# Patient Record
Sex: MALE | Race: OTHER | HISPANIC OR LATINO
[De-identification: names, ages, dates, MRNs, and addresses within clinical notes are randomized per-mention and may not be internally consistent; named-entity substitution may affect disease eponyms.]

---

## 2018-09-28 ENCOUNTER — APPOINTMENT (OUTPATIENT)
Dept: INTERNAL MEDICINE | Facility: CLINIC | Age: 48
End: 2018-09-28
Payer: MEDICAID

## 2018-09-28 VITALS
WEIGHT: 177 LBS | HEIGHT: 68 IN | HEART RATE: 65 BPM | DIASTOLIC BLOOD PRESSURE: 82 MMHG | OXYGEN SATURATION: 98 % | BODY MASS INDEX: 26.83 KG/M2 | TEMPERATURE: 98 F | SYSTOLIC BLOOD PRESSURE: 126 MMHG

## 2018-09-28 VITALS — DIASTOLIC BLOOD PRESSURE: 88 MMHG | SYSTOLIC BLOOD PRESSURE: 130 MMHG

## 2018-09-28 DIAGNOSIS — Z82.49 FAMILY HISTORY OF ISCHEMIC HEART DISEASE AND OTHER DISEASES OF THE CIRCULATORY SYSTEM: ICD-10-CM

## 2018-09-28 DIAGNOSIS — Z83.3 FAMILY HISTORY OF DIABETES MELLITUS: ICD-10-CM

## 2018-09-28 PROCEDURE — 36415 COLL VENOUS BLD VENIPUNCTURE: CPT

## 2018-09-28 PROCEDURE — G0008: CPT

## 2018-09-28 PROCEDURE — 99386 PREV VISIT NEW AGE 40-64: CPT | Mod: 25

## 2018-09-28 PROCEDURE — 90686 IIV4 VACC NO PRSV 0.5 ML IM: CPT

## 2018-09-28 RX ORDER — SILDENAFIL 100 MG/1
100 TABLET, FILM COATED ORAL
Qty: 10 | Refills: 6 | Status: ACTIVE | COMMUNITY
Start: 2018-09-28 | End: 1900-01-01

## 2018-10-19 LAB
ALBUMIN SERPL ELPH-MCNC: 4.4 G/DL
ALP BLD-CCNC: 56 U/L
ALT SERPL-CCNC: 30 U/L
ANION GAP SERPL CALC-SCNC: 14 MMOL/L
AST SERPL-CCNC: 32 U/L
BASOPHILS # BLD AUTO: 0.06 K/UL
BASOPHILS NFR BLD AUTO: 1 %
BILIRUB SERPL-MCNC: 0.7 MG/DL
BUN SERPL-MCNC: 15 MG/DL
C TRACH RRNA SPEC QL NAA+PROBE: NOT DETECTED
CALCIUM SERPL-MCNC: 9.5 MG/DL
CHLORIDE SERPL-SCNC: 103 MMOL/L
CHOLEST SERPL-MCNC: 230 MG/DL
CHOLEST/HDLC SERPL: 5.3 RATIO
CO2 SERPL-SCNC: 22 MMOL/L
CREAT SERPL-MCNC: 1.13 MG/DL
EOSINOPHIL # BLD AUTO: 0.22 K/UL
EOSINOPHIL NFR BLD AUTO: 3.6 %
GLUCOSE SERPL-MCNC: 82 MG/DL
HBA1C MFR BLD HPLC: 5.5 %
HCT VFR BLD CALC: 46.6 %
HDLC SERPL-MCNC: 43 MG/DL
HGB BLD-MCNC: 15.2 G/DL
HIV1+2 AB SPEC QL IA.RAPID: NONREACTIVE
IMM GRANULOCYTES NFR BLD AUTO: 0.2 %
LDLC SERPL CALC-MCNC: 135 MG/DL
LYMPHOCYTES # BLD AUTO: 2.69 K/UL
LYMPHOCYTES NFR BLD AUTO: 43.7 %
MAN DIFF?: NORMAL
MCHC RBC-ENTMCNC: 27.1 PG
MCHC RBC-ENTMCNC: 32.6 GM/DL
MCV RBC AUTO: 83.2 FL
MONOCYTES # BLD AUTO: 0.34 K/UL
MONOCYTES NFR BLD AUTO: 5.5 %
N GONORRHOEA RRNA SPEC QL NAA+PROBE: NOT DETECTED
NEUTROPHILS # BLD AUTO: 2.83 K/UL
NEUTROPHILS NFR BLD AUTO: 46 %
PLATELET # BLD AUTO: 253 K/UL
POTASSIUM SERPL-SCNC: 3.9 MMOL/L
PROT SERPL-MCNC: 7.8 G/DL
PSA SERPL-MCNC: 0.73 NG/ML
RBC # BLD: 5.6 M/UL
RBC # FLD: 13.9 %
SODIUM SERPL-SCNC: 139 MMOL/L
SOURCE AMPLIFICATION: NORMAL
TRIGL SERPL-MCNC: 261 MG/DL
WBC # FLD AUTO: 6.15 K/UL

## 2019-03-05 NOTE — PHYSICAL EXAM
[No Acute Distress] : no acute distress [Well Nourished] : well nourished [Well Developed] : well developed [Well-Appearing] : well-appearing [Normal Sclera/Conjunctiva] : normal sclera/conjunctiva [PERRL] : pupils equal round and reactive to light [Normal Outer Ear/Nose] : the outer ears and nose were normal in appearance [Normal Oropharynx] : the oropharynx was normal [Normal TMs] : both tympanic membranes were normal [No JVD] : no jugular venous distention [Supple] : supple [No Lymphadenopathy] : no lymphadenopathy [Thyroid Normal, No Nodules] : the thyroid was normal and there were no nodules present [No Respiratory Distress] : no respiratory distress  [Clear to Auscultation] : lungs were clear to auscultation bilaterally [No Accessory Muscle Use] : no accessory muscle use [Normal Percussion] : the chest was normal to percussion [Normal Rate] : normal rate  [Regular Rhythm] : with a regular rhythm [Normal S1, S2] : normal S1 and S2 [No Murmur] : no murmur heard [No Carotid Bruits] : no carotid bruits [No Abdominal Bruit] : a ~M bruit was not heard ~T in the abdomen [No Varicosities] : no varicosities [Pedal Pulses Present] : the pedal pulses are present [No Edema] : there was no peripheral edema [No Extremity Clubbing/Cyanosis] : no extremity clubbing/cyanosis [No Palpable Aorta] : no palpable aorta [Declined Breast Exam] : declined breast exam  [Normal Appearance] : normal in appearance [No Nipple Discharge] : no nipple discharge [Soft] : abdomen soft [Non Tender] : non-tender [Non-distended] : non-distended [No Masses] : no abdominal mass palpated [No HSM] : no HSM [Normal Bowel Sounds] : normal bowel sounds [No Hernias] : no hernias [No Stool to Guaiac] : no stool to guaiac [Normal Sphincter Tone] : normal sphincter tone [Normal Supraclavicular Nodes] : no supraclavicular lymphadenopathy [Normal Axillary Nodes] : no axillary lymphadenopathy [Normal Posterior Cervical Nodes] : no posterior cervical lymphadenopathy [Normal Femoral Nodes] : no femoral lymphadenopathy [Normal Anterior Cervical Nodes] : no anterior cervical lymphadenopathy [Normal Inguinal Nodes] : no inguinal lymphadenopathy [No Joint Swelling] : no joint swelling [Grossly Normal Strength/Tone] : grossly normal strength/tone [No Rash] : no rash [No Skin Lesions] : no skin lesions [Normal Gait] : normal gait [Coordination Grossly Intact] : coordination grossly intact [No Focal Deficits] : no focal deficits [Deep Tendon Reflexes (DTR)] : deep tendon reflexes were 2+ and symmetric [Speech Grossly Normal] : speech grossly normal [Memory Grossly Normal] : memory grossly normal [Normal Affect] : the affect was normal [Alert and Oriented x3] : oriented to person, place, and time [Normal Mood] : the mood was normal [Normal Insight/Judgement] : insight and judgment were intact [Stool Occult Blood] : stool negative for occult blood [de-identified] : -Pes planus Tenderness left instep [de-identified] : Right side skin tag

## 2019-03-05 NOTE — HISTORY OF PRESENT ILLNESS
[FreeTextEntry1] : Comprehensive annual physical examination left foot pain [de-identified] : \par \par The patient is a 47-year-old male with no significant past medical history who presents for comprehensive annual physical examination. Patient complains of several week history of left foot pain after some chronic has pain and swelling after playing tennis. Denies redness, fever, chills, also denies chest pain, shortness of breath, dyspnea exertion notes occasionally blood-tinged stool. Denies trauma pain, nausea, vomiting, constipation, diarrhea, weight loss. Otherwise, feels well

## 2019-03-05 NOTE — REVIEW OF SYSTEMS
[Melena] : melerick [Frequency] : frequency [Joint Pain] : joint pain [Negative] : Heme/Lymph [Abdominal Pain] : no abdominal pain [Nausea] : no nausea [Constipation] : no constipation [Diarrhea] : no diarrhea [Vomiting] : no vomiting [Heartburn] : no heartburn [Dysuria] : no dysuria [Incontinence] : no incontinence [Hesitancy] : no hesitancy [Nocturia] : no nocturia [Hematuria] : no hematuria [Impotence] : no impotency [Poor Libido] : libido not poor [Joint Stiffness] : no joint stiffness [Muscle Pain] : no muscle pain [Muscle Weakness] : no muscle weakness [Back Pain] : no back pain [Joint Swelling] : no joint swelling

## 2019-03-05 NOTE — HEALTH RISK ASSESSMENT
[Excellent] : ~his/her~  mood as  excellent [No falls in past year] : Patient reported no falls in the past year [0] : 2) Feeling down, depressed, or hopeless: Not at all (0) [Patient reported colonoscopy was normal] : Patient reported colonoscopy was normal [] : No [de-identified] : glass wine one a night [ColonoscopyDate] : 2010

## 2019-03-05 NOTE — ASSESSMENT
[FreeTextEntry1] : The patient is a healthy 47-year-old male, who presents with left foot pain, and for comprehensive annual physical\par \par \par #1 Left foot injury soft tissue.\par Check x-ray.\par Ice, elevation, nonsteroidals.\par If fails to improved refer to orthopedist\par \par #2 increased frequency urination.\par Was likely   secondary to BPH.\par Check PSA, UA.\par \par #3 health maintenance.\par Check CBC, metabolic panel, lipid panel, UA, PSA, HIV, GC, chlamydia, and hemoglobin A1c\par f/u up and to follow

## 2019-03-08 ENCOUNTER — APPOINTMENT (OUTPATIENT)
Dept: INTERNAL MEDICINE | Facility: CLINIC | Age: 49
End: 2019-03-08
Payer: MEDICAID

## 2019-03-08 VITALS
OXYGEN SATURATION: 98 % | TEMPERATURE: 98.2 F | DIASTOLIC BLOOD PRESSURE: 86 MMHG | HEIGHT: 68 IN | BODY MASS INDEX: 26.67 KG/M2 | WEIGHT: 176 LBS | SYSTOLIC BLOOD PRESSURE: 138 MMHG | HEART RATE: 68 BPM

## 2019-03-08 PROCEDURE — 99214 OFFICE O/P EST MOD 30 MIN: CPT

## 2019-03-08 NOTE — ASSESSMENT
[FreeTextEntry1] : Patient is a healthy 48-year-old male who presents with several month history of bilateral shoulder pain and episode of traveler's diarrhea\par \par 1.  Traveler's diarrhea\par Res[facility]\par Observe\par \par 2.  Bilateral shoulder pain\par Is likely tendinitis right greater than left\par Nonresponsive to rest ice and nonsteroidals\par Refer to orthopedics\par \par 3.  Health maintenance\par Patient reports colonoscopy in September normal

## 2019-03-08 NOTE — HISTORY OF PRESENT ILLNESS
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  He denies weakness, fever, rash.  Also recent episode of diarrhea after returning from [place] denies melena abdominal pain nausea vomiting fever chills

## 2019-03-08 NOTE — REVIEW OF SYSTEMS
[Joint Pain] : joint pain [Negative] : Gastrointestinal [Joint Stiffness] : no joint stiffness [Muscle Pain] : no muscle pain [Muscle Weakness] : no muscle weakness [Back Pain] : no back pain [Joint Swelling] : no joint swelling

## 2019-03-08 NOTE — PHYSICAL EXAM
[No Acute Distress] : no acute distress [Well Nourished] : well nourished [Well Developed] : well developed [Normal Voice/Communication] : normal voice/communication [No Respiratory Distress] : no respiratory distress  [Clear to Auscultation] : lungs were clear to auscultation bilaterally [No Accessory Muscle Use] : no accessory muscle use [Normal Rate] : normal rate  [Regular Rhythm] : with a regular rhythm [Normal S1, S2] : normal S1 and S2 [Soft] : abdomen soft [Non Tender] : non-tender [No HSM] : no HSM [Normal Bowel Sounds] : normal bowel sounds [de-identified] : Bilateral full range of motion minimal pain no point tenderness no swelling no erythema negative empty can sign

## 2019-04-02 ENCOUNTER — FORM ENCOUNTER (OUTPATIENT)
Age: 49
End: 2019-04-02

## 2019-04-03 ENCOUNTER — OUTPATIENT (OUTPATIENT)
Dept: OUTPATIENT SERVICES | Facility: HOSPITAL | Age: 49
LOS: 1 days | End: 2019-04-03
Payer: MEDICAID

## 2019-04-03 ENCOUNTER — APPOINTMENT (OUTPATIENT)
Dept: PHYSICAL MEDICINE AND REHAB | Facility: CLINIC | Age: 49
End: 2019-04-03
Payer: MEDICAID

## 2019-04-03 VITALS — HEART RATE: 72 BPM | WEIGHT: 179 LBS | OXYGEN SATURATION: 98 % | HEIGHT: 68 IN | BODY MASS INDEX: 27.13 KG/M2

## 2019-04-03 DIAGNOSIS — M25.511 PAIN IN RIGHT SHOULDER: ICD-10-CM

## 2019-04-03 DIAGNOSIS — M25.512 PAIN IN RIGHT SHOULDER: ICD-10-CM

## 2019-04-03 PROCEDURE — 99203 OFFICE O/P NEW LOW 30 MIN: CPT

## 2019-04-03 PROCEDURE — 73030 X-RAY EXAM OF SHOULDER: CPT | Mod: 26,50

## 2019-04-03 PROCEDURE — 73030 X-RAY EXAM OF SHOULDER: CPT

## 2019-04-03 NOTE — PHYSICAL EXAM
[FreeTextEntry1] : GEN: AAOx3, NAD.\par PSYCH: Normal mood and affect. Responds appropriately to commands.\par EYES: Sclerae Anicteric. No discharge. EOMI.\par RESP: Breathing unlabored.\par CV: Radial pulses 2+ and equal. No varicosities noted.\par EXT: No C/C/E.\par SKIN: No lesions noted.\par LYMPH: No supraclavicular, anterior or posterior cervical lymphadenopathy appreciated.\par GAIT: Non antalgic, normal reciprocating heel to toe.\par INSP: No Atrophy, Bony Deformity, Swelling. Shoulder height symmetric. Normal Thoracic Kyphosis.\par Cervical ROM: Full and pain free.\par Shoulder AROM: Full with pain Flex/ABd>90 Right. Painful Arc (+) Right. No crepitus. No scapulothoracic dyskinesia or winging. PROM IR/ER full with pain throughout range IR/ER.\par PALP: No tenderness anterior, lateral, or posterior compartments.\par STRENGTH: 5/5 bilateral shoulder abductors, internal rotation, external rotation, elbow flexors, elbow extensors, wrist extensors, long finger flexors, hand intrinsics, .\par TONE: Normal, No clonus.\par SENSATION:  Normal to light touch in the bilateral upper extremities.\par REFLEXES: 2+ symmetric biceps, triceps, brachioradialis, pronator teres. Marte negative bilaterally.\par SPECIAL: Neers, Walter, Stephanie, Empty Can, Speed’s (+) Right. Drop arm, Spurling's negative bilaterally.

## 2019-04-03 NOTE — ASSESSMENT
[FreeTextEntry1] : Impression:\par 1. Right RTC Tendinopathy/SAB\par \par Plan: After review of the history, physical examination, and imaging, the patient's symptoms are consistent with Right RTC Tendinopathy/SAB. The imaging results and diagnosis were discussed in detail with the patient. We discussed all the potential treatment options including physical therapy, oral medication, ultrasound guided injections, and surgery; as well as alternative therapeutics such as acupuncture and massage. We also discussed the importance of weight loss, ergonomics, and posture in the long term management of the condition. At this time I am recommending that the patient begin a formal PT program. The patient would also like to schedule USG SAB CSI due to the degree of pain he is in. The patient will return to the office for injection and then begin PT and followup in 2-3 months. The patient expressed verbal understanding and is in agreement with the plan of care. All of the patient's questions and concerns were addressed during today's visit.\par

## 2019-04-03 NOTE — HISTORY OF PRESENT ILLNESS
[FreeTextEntry1] : Mr. JOSEPH AVILES is a very pleasant 48 year male who comes in for evaluation of the right shoulder that has been ongoing for 4 months without any specific injury or inciting event. The pain is located primarily the right shoulder intermittent in nature and described as sharp pain. The pain is rated as 4/10 during today's visit, and ranges from 6-8/10. The patient's symptoms are aggravated by exercising and alleviated by doing nothing. The patient denies any night pain, numbness/tingling, weakness, or bowel/bladder dysfunction. The patient has no other complaints at this time.

## 2019-04-04 ENCOUNTER — APPOINTMENT (OUTPATIENT)
Dept: PHYSICAL MEDICINE AND REHAB | Facility: CLINIC | Age: 49
End: 2019-04-04
Payer: MEDICAID

## 2019-04-04 VITALS — RESPIRATION RATE: 16 BRPM | BODY MASS INDEX: 27.13 KG/M2 | HEART RATE: 68 BPM | HEIGHT: 68 IN | WEIGHT: 179 LBS

## 2019-04-04 DIAGNOSIS — M75.51 BURSITIS OF RIGHT SHOULDER: ICD-10-CM

## 2019-04-04 PROCEDURE — 20611 DRAIN/INJ JOINT/BURSA W/US: CPT | Mod: RT

## 2019-04-18 ENCOUNTER — FORM ENCOUNTER (OUTPATIENT)
Age: 49
End: 2019-04-18

## 2019-04-19 ENCOUNTER — APPOINTMENT (OUTPATIENT)
Dept: ULTRASOUND IMAGING | Facility: HOSPITAL | Age: 49
End: 2019-04-19

## 2019-04-19 ENCOUNTER — OUTPATIENT (OUTPATIENT)
Dept: OUTPATIENT SERVICES | Facility: HOSPITAL | Age: 49
LOS: 1 days | End: 2019-04-19
Payer: MEDICAID

## 2019-04-19 ENCOUNTER — APPOINTMENT (OUTPATIENT)
Dept: INTERNAL MEDICINE | Facility: CLINIC | Age: 49
End: 2019-04-19
Payer: MEDICAID

## 2019-04-19 VITALS
HEART RATE: 75 BPM | WEIGHT: 175 LBS | TEMPERATURE: 97.5 F | SYSTOLIC BLOOD PRESSURE: 126 MMHG | DIASTOLIC BLOOD PRESSURE: 80 MMHG | OXYGEN SATURATION: 98 % | HEIGHT: 68 IN | BODY MASS INDEX: 26.52 KG/M2

## 2019-04-19 PROCEDURE — 76700 US EXAM ABDOM COMPLETE: CPT

## 2019-04-19 PROCEDURE — 76700 US EXAM ABDOM COMPLETE: CPT | Mod: 26

## 2019-04-19 PROCEDURE — 76856 US EXAM PELVIC COMPLETE: CPT

## 2019-04-19 PROCEDURE — 76857 US EXAM PELVIC LIMITED: CPT | Mod: 26

## 2019-04-19 PROCEDURE — 76857 US EXAM PELVIC LIMITED: CPT

## 2019-04-19 PROCEDURE — 99214 OFFICE O/P EST MOD 30 MIN: CPT | Mod: 25

## 2019-04-19 PROCEDURE — 81003 URINALYSIS AUTO W/O SCOPE: CPT | Mod: QW

## 2019-04-19 NOTE — PHYSICAL EXAM
[No Acute Distress] : no acute distress [Well Nourished] : well nourished [Well Developed] : well developed [Well-Appearing] : well-appearing [No JVD] : no jugular venous distention [Supple] : supple [No Lymphadenopathy] : no lymphadenopathy [No Respiratory Distress] : no respiratory distress  [Clear to Auscultation] : lungs were clear to auscultation bilaterally [No Accessory Muscle Use] : no accessory muscle use [Normal Rate] : normal rate  [Regular Rhythm] : with a regular rhythm [Normal S1, S2] : normal S1 and S2 [Soft] : abdomen soft [Non Tender] : non-tender [Non-distended] : non-distended [No HSM] : no HSM [Normal Bowel Sounds] : normal bowel sounds [de-identified] : Right flank tenderness

## 2019-04-19 NOTE — HISTORY OF PRESENT ILLNESS
[FreeTextEntry8] : \par \par CC: Flank pain for 5 days\par \par Patient is a healthy 48-year-old man with history of obstruction status post resection, gallstone status post cholecystectomy and nephrolithiasis who presents for 5-day history of right flank pain severe on Monday almost passed out went almost went to the ear pain is been urinating a lot denies fever, chills but notes persistent flank pain

## 2019-04-19 NOTE — ASSESSMENT
[FreeTextEntry1] : History of nephrolithiasis who presents with acute right flank pain since Monday\par \par 1.  Probable nephrolithiasis\par Hydrate with water 3 L a day\par Advil for pain\par Urine dip showed trace blood\par Referral first sonogram of abdomen and pelvis rule out stone if fails to improve consider urology for referral call back if develops fever pain worsens

## 2019-04-19 NOTE — REVIEW OF SYSTEMS
[Frequency] : frequency [Negative] : Gastrointestinal [Dysuria] : no dysuria [Incontinence] : no incontinence [Hesitancy] : no hesitancy [Nocturia] : no nocturia [Hematuria] : no hematuria [Poor Libido] : libido not poor

## 2019-04-24 DIAGNOSIS — N40.0 BENIGN PROSTATIC HYPERPLASIA WITHOUT LOWER URINARY TRACT SYMPTOMS: ICD-10-CM

## 2019-04-24 DIAGNOSIS — Z90.49 ACQUIRED ABSENCE OF OTHER SPECIFIED PARTS OF DIGESTIVE TRACT: ICD-10-CM

## 2019-04-24 DIAGNOSIS — R10.9 UNSPECIFIED ABDOMINAL PAIN: ICD-10-CM

## 2019-04-25 ENCOUNTER — APPOINTMENT (OUTPATIENT)
Dept: PHYSICAL MEDICINE AND REHAB | Facility: CLINIC | Age: 49
End: 2019-04-25
Payer: MEDICAID

## 2019-04-25 VITALS
BODY MASS INDEX: 26.52 KG/M2 | WEIGHT: 175 LBS | HEIGHT: 68 IN | OXYGEN SATURATION: 98 % | RESPIRATION RATE: 16 BRPM | HEART RATE: 78 BPM

## 2019-04-25 DIAGNOSIS — M75.111 INCOMPLETE ROTATOR CUFF TEAR OR RUPTURE OF RIGHT SHOULDER, NOT SPECIFIED AS TRAUMATIC: ICD-10-CM

## 2019-04-25 PROCEDURE — 20611 DRAIN/INJ JOINT/BURSA W/US: CPT | Mod: LT

## 2019-04-25 PROCEDURE — 99214 OFFICE O/P EST MOD 30 MIN: CPT | Mod: 25

## 2019-07-19 ENCOUNTER — APPOINTMENT (OUTPATIENT)
Dept: INTERNAL MEDICINE | Facility: CLINIC | Age: 49
End: 2019-07-19

## 2019-12-12 ENCOUNTER — FORM ENCOUNTER (OUTPATIENT)
Age: 49
End: 2019-12-12

## 2019-12-13 ENCOUNTER — OUTPATIENT (OUTPATIENT)
Dept: OUTPATIENT SERVICES | Facility: HOSPITAL | Age: 49
LOS: 1 days | End: 2019-12-13
Payer: MEDICAID

## 2019-12-13 ENCOUNTER — APPOINTMENT (OUTPATIENT)
Dept: INTERNAL MEDICINE | Facility: CLINIC | Age: 49
End: 2019-12-13
Payer: MEDICAID

## 2019-12-13 VITALS
OXYGEN SATURATION: 98 % | BODY MASS INDEX: 26.52 KG/M2 | SYSTOLIC BLOOD PRESSURE: 142 MMHG | HEART RATE: 83 BPM | TEMPERATURE: 97.3 F | HEIGHT: 68 IN | WEIGHT: 175 LBS | DIASTOLIC BLOOD PRESSURE: 95 MMHG

## 2019-12-13 DIAGNOSIS — M79.672 PAIN IN LEFT FOOT: ICD-10-CM

## 2019-12-13 DIAGNOSIS — M75.112 INCOMPLETE ROTATOR CUFF TEAR OR RUPTURE OF LEFT SHOULDER, NOT SPECIFIED AS TRAUMATIC: ICD-10-CM

## 2019-12-13 PROCEDURE — 73590 X-RAY EXAM OF LOWER LEG: CPT | Mod: 26,LT

## 2019-12-13 PROCEDURE — G0008: CPT

## 2019-12-13 PROCEDURE — 73590 X-RAY EXAM OF LOWER LEG: CPT

## 2019-12-13 PROCEDURE — 36415 COLL VENOUS BLD VENIPUNCTURE: CPT

## 2019-12-13 PROCEDURE — 90686 IIV4 VACC NO PRSV 0.5 ML IM: CPT

## 2019-12-13 PROCEDURE — 93000 ELECTROCARDIOGRAM COMPLETE: CPT

## 2019-12-13 PROCEDURE — 99396 PREV VISIT EST AGE 40-64: CPT | Mod: 25

## 2019-12-13 PROCEDURE — G0442 ANNUAL ALCOHOL SCREEN 15 MIN: CPT

## 2019-12-13 PROCEDURE — 99213 OFFICE O/P EST LOW 20 MIN: CPT | Mod: 25

## 2019-12-13 NOTE — REVIEW OF SYSTEMS
[Negative] : Heme/Lymph [Muscle Pain] : muscle pain [Joint Pain] : no joint pain [Joint Stiffness] : no joint stiffness [Back Pain] : no back pain [Joint Swelling] : no joint swelling [Muscle Weakness] : no muscle weakness

## 2019-12-13 NOTE — HEALTH RISK ASSESSMENT
[Very Good] : ~his/her~ current health as very good [Yes] : Yes [1 or 2 (0 pts)] : 1 or 2 (0 points) [Less than monthly (1 pt)] : Less than monthly (1 point) [No falls in past year] : Patient reported no falls in the past year [0] : 2) Feeling down, depressed, or hopeless: Not at all (0) [Monthly or less (1 pt)] : Monthly or less (1 point) [] : No [Audit-CScore] : 2 [de-identified] : Healthy [de-identified] : Active [[id number]] : 0

## 2019-12-13 NOTE — ASSESSMENT
[FreeTextEntry1] : Patient is a 48-year-old male with history of kidney stones, mild erectile dysfunction, bilateral shoulder pain who presents for comprehensive annual physical examination and noted to have elevated blood pressure and left shin Alex\par \par 1 isolated elevated blood pressure\par May be secondary post intoxication state\par observe follow-up in 2 to 4 weeks\par low sodium diet\par \par 2 left shin bone bruise\par ice/Advil\par check x-ray rule out fracture\par \par 3 history of kidney stones\par asymptomatic at present time hydration\par \par 4 bilateral shoulder pain\par continue Advil if persist consider MRI of neck\par \par 5 hangover\par  on alcohol use\par \par 6 health maintenance\par flu shot today\par colonoscopy done 2018\par check routine labs\par follow-up in 2 to 4 weeks for blood pressure

## 2019-12-13 NOTE — PHYSICAL EXAM
[No Acute Distress] : no acute distress [Well Developed] : well developed [Well Nourished] : well nourished [PERRL] : pupils equal round and reactive to light [Well-Appearing] : well-appearing [Normal Sclera/Conjunctiva] : normal sclera/conjunctiva [Normal Outer Ear/Nose] : the outer ears and nose were normal in appearance [EOMI] : extraocular movements intact [Normal Oropharynx] : the oropharynx was normal [No Lymphadenopathy] : no lymphadenopathy [No JVD] : no jugular venous distention [Supple] : supple [Thyroid Normal, No Nodules] : the thyroid was normal and there were no nodules present [No Respiratory Distress] : no respiratory distress  [Clear to Auscultation] : lungs were clear to auscultation bilaterally [No Accessory Muscle Use] : no accessory muscle use [Regular Rhythm] : with a regular rhythm [Normal S1, S2] : normal S1 and S2 [Normal Rate] : normal rate  [No Carotid Bruits] : no carotid bruits [No Murmur] : no murmur heard [No Abdominal Bruit] : a ~M bruit was not heard ~T in the abdomen [No Varicosities] : no varicosities [Pedal Pulses Present] : the pedal pulses are present [No Edema] : there was no peripheral edema [No Palpable Aorta] : no palpable aorta [No Extremity Clubbing/Cyanosis] : no extremity clubbing/cyanosis [Soft] : abdomen soft [Non Tender] : non-tender [Non-distended] : non-distended [No HSM] : no HSM [No Masses] : no abdominal mass palpated [Normal Bowel Sounds] : normal bowel sounds [Prostate Enlargement] : the prostate was not enlarged [Testes Tenderness] : no tenderness of the testes [Scrotum] : the scrotum was normal [Penis Abnormality] : normal circumcised penis [Prostate Tenderness] : the prostate was not tender [No Prostate Nodules] : no prostate nodules [Normal Axillary Nodes] : no axillary lymphadenopathy [Normal Supraclavicular Nodes] : no supraclavicular lymphadenopathy [Normal Posterior Cervical Nodes] : no posterior cervical lymphadenopathy [No CVA Tenderness] : no CVA  tenderness [Normal Anterior Cervical Nodes] : no anterior cervical lymphadenopathy [Normal Inguinal Nodes] : no inguinal lymphadenopathy [No Joint Swelling] : no joint swelling [Grossly Normal Strength/Tone] : grossly normal strength/tone [No Spinal Tenderness] : no spinal tenderness [No Skin Lesions] : no skin lesions [No Rash] : no rash [Normal Gait] : normal gait [Coordination Grossly Intact] : coordination grossly intact [No Focal Deficits] : no focal deficits [Deep Tendon Reflexes (DTR)] : deep tendon reflexes were 2+ and symmetric [Normal Affect] : the affect was normal [Normal Insight/Judgement] : insight and judgment were intact [de-identified] : Aguila left shift of the tibia

## 2019-12-13 NOTE — HISTORY OF PRESENT ILLNESS
[FreeTextEntry1] : Comprehensive annual physical examination left shin injury [de-identified] : Patient is a 48-year-old male with history of kidney stones, rectal dysfunction, rotator cuff tear partial who presents for comprehensive annual physical examination complaining of left shin pain after breaking ice with the pickax hitting his association leg was initially swollen black and blue occurred two weeks ago leg still very tender and painful he denies chest pain, shortness of breath distant exertion palpitation lightheadedness nausea vomiting constipation or diarrhea otherwise feels well patient had [employer] party heavily drinking last night somewhat of a hangover

## 2019-12-20 LAB
ALBUMIN SERPL ELPH-MCNC: 5.1 G/DL
ALP BLD-CCNC: 69 U/L
ALT SERPL-CCNC: 153 U/L
ANION GAP SERPL CALC-SCNC: 16 MMOL/L
AST SERPL-CCNC: 62 U/L
BASOPHILS # BLD AUTO: 0.08 K/UL
BASOPHILS NFR BLD AUTO: 1.2 %
BILIRUB SERPL-MCNC: 0.3 MG/DL
BUN SERPL-MCNC: 16 MG/DL
C TRACH RRNA SPEC QL NAA+PROBE: NOT DETECTED
CALCIUM SERPL-MCNC: 9.8 MG/DL
CHLORIDE SERPL-SCNC: 104 MMOL/L
CHOLEST SERPL-MCNC: 242 MG/DL
CHOLEST/HDLC SERPL: 6.5 RATIO
CO2 SERPL-SCNC: 17 MMOL/L
CREAT SERPL-MCNC: 0.98 MG/DL
EOSINOPHIL # BLD AUTO: 0.09 K/UL
EOSINOPHIL NFR BLD AUTO: 1.3 %
GLUCOSE SERPL-MCNC: 81 MG/DL
HCT VFR BLD CALC: 54.2 %
HDLC SERPL-MCNC: 37 MG/DL
HGB BLD-MCNC: 16.8 G/DL
HIV1+2 AB SPEC QL IA.RAPID: NONREACTIVE
IMM GRANULOCYTES NFR BLD AUTO: 0.1 %
LDLC SERPL CALC-MCNC: 126 MG/DL
LYMPHOCYTES # BLD AUTO: 1.75 K/UL
LYMPHOCYTES NFR BLD AUTO: 25.5 %
MAN DIFF?: NORMAL
MCHC RBC-ENTMCNC: 26.2 PG
MCHC RBC-ENTMCNC: 31 GM/DL
MCV RBC AUTO: 84.4 FL
MONOCYTES # BLD AUTO: 0.37 K/UL
MONOCYTES NFR BLD AUTO: 5.4 %
N GONORRHOEA RRNA SPEC QL NAA+PROBE: NOT DETECTED
NEUTROPHILS # BLD AUTO: 4.55 K/UL
NEUTROPHILS NFR BLD AUTO: 66.5 %
PLATELET # BLD AUTO: 317 K/UL
POTASSIUM SERPL-SCNC: 4.7 MMOL/L
PROT SERPL-MCNC: 7.8 G/DL
PSA SERPL-MCNC: 0.41 NG/ML
RBC # BLD: 6.42 M/UL
RBC # FLD: 15 %
SODIUM SERPL-SCNC: 137 MMOL/L
SOURCE AMPLIFICATION: NORMAL
TRIGL SERPL-MCNC: 395 MG/DL
WBC # FLD AUTO: 6.85 K/UL

## 2020-01-10 ENCOUNTER — APPOINTMENT (OUTPATIENT)
Dept: INTERNAL MEDICINE | Facility: CLINIC | Age: 50
End: 2020-01-10
Payer: MEDICAID

## 2020-01-10 VITALS
HEART RATE: 77 BPM | BODY MASS INDEX: 27.28 KG/M2 | OXYGEN SATURATION: 97 % | TEMPERATURE: 98.3 F | DIASTOLIC BLOOD PRESSURE: 78 MMHG | WEIGHT: 180 LBS | SYSTOLIC BLOOD PRESSURE: 126 MMHG | HEIGHT: 68 IN

## 2020-01-10 VITALS — SYSTOLIC BLOOD PRESSURE: 122 MMHG | DIASTOLIC BLOOD PRESSURE: 90 MMHG

## 2020-01-10 VITALS — DIASTOLIC BLOOD PRESSURE: 92 MMHG | SYSTOLIC BLOOD PRESSURE: 120 MMHG

## 2020-01-10 PROCEDURE — 99214 OFFICE O/P EST MOD 30 MIN: CPT | Mod: 25

## 2020-01-10 PROCEDURE — 36415 COLL VENOUS BLD VENIPUNCTURE: CPT

## 2020-01-10 NOTE — ASSESSMENT
[FreeTextEntry1] : Patient is a 49-year-old male with history of kidney stones who presents for follow-up of alcohol intoxication leg pain isolated elevated blood pressure transaminases\par \par 1 isolated elevated blood pressure\par remain slightly elevated diastolic\par observe blood pressure check at home\par follow-up in two months\par \par 2 transaminases\par most likely related to alcohol\par check can profile\par \par 3 transaminases\par most likely related to alcohol

## 2020-01-10 NOTE — HISTORY OF PRESENT ILLNESS
[FreeTextEntry1] : Follow-up for elevated blood pressure abnormal liver function test [de-identified] : Patient is a healthy 49-year-old male with history of kidney stones who presented last visit intoxicated with elevated blood pressure transaminases triglyceride anemia who presents for follow-up patient feels well hasn't drank denies chest pain, shortness of breath palpitation leg pain has improved.

## 2020-02-04 LAB
ALBUMIN SERPL ELPH-MCNC: 4.9 G/DL
ALP BLD-CCNC: 51 U/L
ALT SERPL-CCNC: 63 U/L
ANION GAP SERPL CALC-SCNC: 12 MMOL/L
AST SERPL-CCNC: 31 U/L
BILIRUB SERPL-MCNC: 0.3 MG/DL
BUN SERPL-MCNC: 17 MG/DL
CALCIUM SERPL-MCNC: 9.6 MG/DL
CHLORIDE SERPL-SCNC: 103 MMOL/L
CO2 SERPL-SCNC: 24 MMOL/L
CREAT SERPL-MCNC: 1.23 MG/DL
GLUCOSE SERPL-MCNC: 95 MG/DL
POTASSIUM SERPL-SCNC: 4.5 MMOL/L
PROT SERPL-MCNC: 7.5 G/DL
SODIUM SERPL-SCNC: 139 MMOL/L

## 2020-03-13 ENCOUNTER — APPOINTMENT (OUTPATIENT)
Dept: INTERNAL MEDICINE | Facility: CLINIC | Age: 50
End: 2020-03-13

## 2020-05-01 ENCOUNTER — APPOINTMENT (OUTPATIENT)
Dept: INTERNAL MEDICINE | Facility: CLINIC | Age: 50
End: 2020-05-01
Payer: MEDICAID

## 2020-05-01 PROCEDURE — 99442: CPT

## 2020-05-01 NOTE — HISTORY OF PRESENT ILLNESS
[FreeTextEntry1] : Patient called regarding COVID infection. Patient was [facility] I was in my office in [place]. Patient instructed this was a billable encounter he gave verbal consent.\par \par HPI; the patient is a 49-year-old male with history of isolated elevated blood pressure who to three weeks ago had a chills diarrhea no fever no cough no shortness of breath however and had several friends who were COVID positive patient went to [facility] on April 26 swab and tested positive. Presently patient feels well complains of bilateral back pain achiness at times denies dysuria, fever, chills.\par \par \par \par Physical exam patient sounded alert and oriented no him nonlabor's breathing

## 2020-05-01 NOTE — PLAN
[FreeTextEntry1] : Patient is a 49-year-old male with history of isolated elevated blood pressure who was diagnosed covered positive\par \par 1 COVID positive\par swab done five days ago weight seven days of isolation/quarantine\par Tylenol for pain May use Advil if fails to improve\par most likely was positive 2 to 3 weeks ago\par \par 2 hypertension\par needs follow-up in several weeks\par \par Appointment started at 219 ended at 2:32 PM\par greater than 50% work counseling on COVID.

## 2020-05-11 ENCOUNTER — APPOINTMENT (OUTPATIENT)
Dept: INTERNAL MEDICINE | Facility: CLINIC | Age: 50
End: 2020-05-11
Payer: MEDICAID

## 2020-05-11 PROCEDURE — 99214 OFFICE O/P EST MOD 30 MIN: CPT | Mod: 95

## 2020-05-11 NOTE — ASSESSMENT
[FreeTextEntry1] : Patient is a 49-year-old male with history of isolate elevated blood pressure, kidney stones and recently swamped positive COVID-19 who presents for follow-up of elevated blood pressure and COVID-19 testing.\par \par 1. Hypertension\par counseled on diet and exercise\par start amlodipine 5 mg Q day\par follow-up in two weeks\par \par 2. COVID-19 infection\par initial swabbed on April 26 positive repeat done May 10 negative\par await blood titers\par  on social distancing\par \par Appointment started at 2:18 PM ended at 2:35 PM\par greater than 50% of time spent counseling on low sodium diet exercise social distancing.\par And side effects of medication.

## 2020-05-11 NOTE — HISTORY OF PRESENT ILLNESS
[FreeTextEntry8] : Patient called regarding elevated blood pressure requesting tele-video visit. Patient was is in the home in [facility] I was my office in [facility]. Patient was instructed this was a billable encounter patient gave verbal consent..\par \par CC: elevated blood pressure\par \par HPI: patient is a 49-year-old male with history of isolated elevated blood pressure kidney stones and recently tested swab positive for COVID-19 on April 26 when had minor of cold like symptoms. Patient was recently again [facility] was [facility]ab negative antibody test pending. Patient noted to have an elevated blood pressure reportedly of 162/128 repeated at 127/75. Patient noted headache and some dizziness patient was sent home to follow up with PMD. Patient took blood pressure in the morning this morning was 137/97 notes mild headache slight dizziness no chest pain or shortness of breath palpitation no fever chills

## 2020-05-11 NOTE — REVIEW OF SYSTEMS
[Headache] : headache [Dizziness] : dizziness [Fainting] : no fainting [Confusion] : no confusion [Unsteady Walk] : no ataxia [Memory Loss] : no memory loss [Negative] : Musculoskeletal

## 2020-06-25 ENCOUNTER — APPOINTMENT (OUTPATIENT)
Dept: INTERNAL MEDICINE | Facility: CLINIC | Age: 50
End: 2020-06-25

## 2020-07-02 ENCOUNTER — APPOINTMENT (OUTPATIENT)
Dept: INTERNAL MEDICINE | Facility: CLINIC | Age: 50
End: 2020-07-02

## 2020-10-16 ENCOUNTER — APPOINTMENT (OUTPATIENT)
Dept: INTERNAL MEDICINE | Facility: CLINIC | Age: 50
End: 2020-10-16
Payer: MEDICAID

## 2020-10-16 ENCOUNTER — NON-APPOINTMENT (OUTPATIENT)
Age: 50
End: 2020-10-16

## 2020-10-16 VITALS
OXYGEN SATURATION: 97 % | SYSTOLIC BLOOD PRESSURE: 118 MMHG | HEIGHT: 68 IN | TEMPERATURE: 98.9 F | HEART RATE: 92 BPM | DIASTOLIC BLOOD PRESSURE: 82 MMHG | BODY MASS INDEX: 27.6 KG/M2 | WEIGHT: 182.13 LBS

## 2020-10-16 VITALS — SYSTOLIC BLOOD PRESSURE: 120 MMHG | DIASTOLIC BLOOD PRESSURE: 86 MMHG

## 2020-10-16 DIAGNOSIS — R74.8 ABNORMAL LEVELS OF OTHER SERUM ENZYMES: ICD-10-CM

## 2020-10-16 DIAGNOSIS — R79.89 OTHER SPECIFIED ABNORMAL FINDINGS OF BLOOD CHEMISTRY: ICD-10-CM

## 2020-10-16 PROCEDURE — G0008: CPT

## 2020-10-16 PROCEDURE — 36415 COLL VENOUS BLD VENIPUNCTURE: CPT

## 2020-10-16 PROCEDURE — 90686 IIV4 VACC NO PRSV 0.5 ML IM: CPT

## 2020-10-16 PROCEDURE — 99214 OFFICE O/P EST MOD 30 MIN: CPT | Mod: 25

## 2020-10-16 PROCEDURE — 93000 ELECTROCARDIOGRAM COMPLETE: CPT

## 2020-10-16 NOTE — PHYSICAL EXAM
[Normal Sclera/Conjunctiva] : normal sclera/conjunctiva [Normal Outer Ear/Nose] : the outer ears and nose were normal in appearance [Normal] : no posterior cervical lymphadenopathy and no anterior cervical lymphadenopathy

## 2020-10-16 NOTE — HISTORY OF PRESENT ILLNESS
[FreeTextEntry1] : Follow-up for hypertension and covert infection [de-identified] : Patient is a healthy 49-year-old male with history of kidney stones, who in April developed COVID-19 infection status post recovery also noted to have elevated blood pressure at home monitoring who now presents for follow-up patient feels well occasionally gets a headache noted elevated blood pressure denies chest pain, shortness of breath distant exertion hypertension runs in the family otherwise feels well no fever chills

## 2020-10-16 NOTE — ASSESSMENT
[FreeTextEntry1] : She is a healthy 49-year-old male with history of hypertension, recent covert infection, kidney stones, soft tissue mass, who presents for follow-up of blood pressure, COVID, abnormal CBC ALT\par \par 1. Hypertension\par continue amlodipine five\par observe.\par Counseled on diet exercise\par \par 2. History of COVID-19 infection\par check COVID antibodies D dimer thyroid\par observe social distancing\par \par 3. Abnormal CBC\par we check CBC\par \par 4 abnormal ALT\par we check ALT\par follow-up in three months for CPE

## 2020-10-19 LAB
ALT SERPL-CCNC: 45 U/L
BASOPHILS # BLD AUTO: 0.06 K/UL
BASOPHILS NFR BLD AUTO: 1.2 %
DEPRECATED D DIMER PPP IA-ACNC: <150 NG/ML DDU
EOSINOPHIL # BLD AUTO: 0.24 K/UL
EOSINOPHIL NFR BLD AUTO: 4.7 %
HCT VFR BLD CALC: 48.1 %
HGB BLD-MCNC: 15.9 G/DL
IMM GRANULOCYTES NFR BLD AUTO: 0.2 %
LYMPHOCYTES # BLD AUTO: 2.13 K/UL
LYMPHOCYTES NFR BLD AUTO: 41.6 %
MAN DIFF?: NORMAL
MCHC RBC-ENTMCNC: 27.6 PG
MCHC RBC-ENTMCNC: 33.1 GM/DL
MCV RBC AUTO: 83.5 FL
MONOCYTES # BLD AUTO: 0.35 K/UL
MONOCYTES NFR BLD AUTO: 6.8 %
NEUTROPHILS # BLD AUTO: 2.33 K/UL
NEUTROPHILS NFR BLD AUTO: 45.5 %
PLATELET # BLD AUTO: 238 K/UL
RBC # BLD: 5.76 M/UL
RBC # FLD: 12.4 %
SARS-COV-2 IGG SERPL IA-ACNC: 0.49 INDEX
SARS-COV-2 IGG SERPL QL IA: NEGATIVE
TSH SERPL-ACNC: 1.68 UIU/ML
WBC # FLD AUTO: 5.12 K/UL

## 2020-12-18 ENCOUNTER — NON-APPOINTMENT (OUTPATIENT)
Age: 50
End: 2020-12-18

## 2020-12-18 ENCOUNTER — APPOINTMENT (OUTPATIENT)
Dept: INTERNAL MEDICINE | Facility: CLINIC | Age: 50
End: 2020-12-18
Payer: MEDICAID

## 2020-12-18 VITALS
OXYGEN SATURATION: 99 % | TEMPERATURE: 97.4 F | WEIGHT: 187 LBS | HEART RATE: 81 BPM | SYSTOLIC BLOOD PRESSURE: 105 MMHG | HEIGHT: 68 IN | BODY MASS INDEX: 28.34 KG/M2 | DIASTOLIC BLOOD PRESSURE: 78 MMHG

## 2020-12-18 VITALS — SYSTOLIC BLOOD PRESSURE: 126 MMHG | DIASTOLIC BLOOD PRESSURE: 76 MMHG

## 2020-12-18 DIAGNOSIS — Z80.42 FAMILY HISTORY OF MALIGNANT NEOPLASM OF PROSTATE: ICD-10-CM

## 2020-12-18 DIAGNOSIS — Z84.2 FAMILY HISTORY OF OTHER DISEASES OF THE GENITOURINARY SYSTEM: ICD-10-CM

## 2020-12-18 DIAGNOSIS — Z86.19 PERSONAL HISTORY OF OTHER INFECTIOUS AND PARASITIC DISEASES: ICD-10-CM

## 2020-12-18 DIAGNOSIS — T14.8XXA OTHER INJURY OF UNSPECIFIED BODY REGION, INITIAL ENCOUNTER: ICD-10-CM

## 2020-12-18 DIAGNOSIS — U07.1 COVID-19: ICD-10-CM

## 2020-12-18 DIAGNOSIS — Z87.39 PERSONAL HISTORY OF OTHER DISEASES OF THE MUSCULOSKELETAL SYSTEM AND CONNECTIVE TISSUE: ICD-10-CM

## 2020-12-18 LAB
BILIRUB UR QL STRIP: NEGATIVE
CLARITY UR: CLEAR
COLLECTION METHOD: NORMAL
GLUCOSE UR-MCNC: NEGATIVE
HCG UR QL: 0.2 EU/DL
HGB UR QL STRIP.AUTO: NEGATIVE
KETONES UR-MCNC: NEGATIVE
LEUKOCYTE ESTERASE UR QL STRIP: NEGATIVE
NITRITE UR QL STRIP: NEGATIVE
PH UR STRIP: 6
PROT UR STRIP-MCNC: NEGATIVE
SP GR UR STRIP: 1.02

## 2020-12-18 PROCEDURE — 81003 URINALYSIS AUTO W/O SCOPE: CPT | Mod: QW

## 2020-12-18 PROCEDURE — 99396 PREV VISIT EST AGE 40-64: CPT | Mod: 25

## 2020-12-18 PROCEDURE — 99072 ADDL SUPL MATRL&STAF TM PHE: CPT

## 2020-12-18 PROCEDURE — 36415 COLL VENOUS BLD VENIPUNCTURE: CPT

## 2020-12-18 NOTE — HEALTH RISK ASSESSMENT
[Very Good] : ~his/her~  mood as very good [No] : No [Monthly or less (1 pt)] : Monthly or less (1 point) [1 or 2 (0 pts)] : 1 or 2 (0 points) [Never (0 pts)] : Never (0 points) [No falls in past year] : Patient reported no falls in the past year [0] : 2) Feeling down, depressed, or hopeless: Not at all (0) [Patient reported colonoscopy was normal] : Patient reported colonoscopy was normal [HIV test declined] : HIV test declined [Hepatitis C test declined] : Hepatitis C test declined [None] : None [With Family] : lives with family [# of Members in Household ___] :  household currently consist of [unfilled] member(s) [Employed] : employed [College] : College [] :  [# Of Children ___] : has [unfilled] children [Sexually Active] : sexually active [Feels Safe at Home] : Feels safe at home [Fully functional (bathing, dressing, toileting, transferring, walking, feeding)] : Fully functional (bathing, dressing, toileting, transferring, walking, feeding) [Fully functional (using the telephone, shopping, preparing meals, housekeeping, doing laundry, using] : Fully functional and needs no help or supervision to perform IADLs (using the telephone, shopping, preparing meals, housekeeping, doing laundry, using transportation, managing medications and managing finances) [Reports normal functional visual acuity (ie: able to read med bottle)] : Reports normal functional visual acuity [Smoke Detector] : smoke detector [Carbon Monoxide Detector] : carbon monoxide detector [Safety elements used in home] : safety elements used in home [Seat Belt] :  uses seat belt [Sunscreen] : uses sunscreen [] : No [Audit-CScore] : 1 [de-identified] :  [de-identified] : Fairly healthy [[id number]] : 0 [Change in mental status noted] : No change in mental status noted [Language] : denies difficulty with language [Behavior] : denies difficulty with behavior [Handling Complex Tasks] : denies difficulty handling complex tasks [Reasoning] : denies difficulty with reasoning [Spatial Ability and Orientation] : denies difficulty with spatial ability and orientation [High Risk Behavior] : no high risk behavior [Reports changes in hearing] : Reports no changes in hearing [Reports changes in vision] : Reports no changes in vision [Reports changes in dental health] : Reports no changes in dental health [Guns at Home] : no guns at home [Travel to Developing Areas] : does not  travel to developing areas [TB Exposure] : is not being exposed to tuberculosis [Caregiver Concerns] : does not have caregiver concerns [ColonoscopyDate] : 2018

## 2020-12-18 NOTE — PHYSICAL EXAM
[Normal Sclera/Conjunctiva] : normal sclera/conjunctiva [Normal Outer Ear/Nose] : the outer ears and nose were normal in appearance [Normal Appearance] : normal in appearance [No Masses] : no palpable masses [No Nipple Discharge] : no nipple discharge [No Axillary Lymphadenopathy] : no axillary lymphadenopathy [Normal Sphincter Tone] : normal sphincter tone [No Mass] : no mass [Penis Abnormality] : normal circumcised penis [Scrotum] : the scrotum was normal [Testes Tenderness] : no tenderness of the testes [Prostate Enlarged] : was enlarged [Normal] : affect was normal and insight and judgment were intact [Stool Occult Blood] : stool negative for occult blood [Prostate Nodule] : did not have a nodule [Prostate Tenderness] : was not tender [Prostate Fluctuant] : was not fluctuant

## 2020-12-18 NOTE — REVIEW OF SYSTEMS
[Hesitancy] : hesitancy [Frequency] : frequency [Joint Pain] : joint pain [Negative] : Heme/Lymph [Dysuria] : no dysuria [Incontinence] : no incontinence [Nocturia] : no nocturia [Hematuria] : no hematuria [Impotence] : no impotency [Poor Libido] : libido not poor [Joint Stiffness] : no joint stiffness [Muscle Pain] : no muscle pain [Muscle Weakness] : no muscle weakness [Back Pain] : no back pain [Joint Swelling] : no joint swelling [FreeTextEntry9] : Shoulder pain

## 2020-12-18 NOTE — ASSESSMENT
[FreeTextEntry1] : Patient is a 89-year-old male with history of isolated elevated blood pressure/hypertension, kidney stones, erectile dysfunction, BPH with urinary hesitancy and possible covert infection who presents for comprehensive annual physical examination\par \par 1. Hypertension\par unclear whether whitecoat versus hypertension\par no LVH on EKG\par observe on amlodipine five milligrams consider trial off again\par \par 2. BPH with hesitancy\par does one to stop medication at present time will observe check UA\par \par 3. Erectile dysfunction\par start Viagra hundred milligrams as needed\par \par 4. History of kidney stones\par coverage hydration check UA\par \par 5. Health maintenance\par consider shingles vaccine\par reportedly had colonoscopy 2018 request copy\par flu shot already given\par check routine labs including PSA follow-up in six months

## 2020-12-18 NOTE — HISTORY OF PRESENT ILLNESS
[FreeTextEntry1] : Comprehensive annual physical examination follow-up for hypertension [de-identified] : The patient is a healthy 49-year-old male with history of hypertension erectile dysfunction, bilateral shoulder pain/rotator cuff injury, kidney stones, BPH who presents for comprehensive annual physical examination patient feels well denies chest pain, shortness of breath distant exertion palpitation only complains of shoulder pain and erectile dysfunction denies depression, weight gain, nausea vomiting, chest pain, palpitation, lightheadedness

## 2021-01-08 LAB
ALBUMIN SERPL ELPH-MCNC: 4.6 G/DL
ALP BLD-CCNC: 68 U/L
ALT SERPL-CCNC: 62 U/L
ANION GAP SERPL CALC-SCNC: 12 MMOL/L
APPEARANCE: CLEAR
AST SERPL-CCNC: 34 U/L
BACTERIA: NEGATIVE
BASOPHILS # BLD AUTO: 0.08 K/UL
BASOPHILS NFR BLD AUTO: 1.2 %
BILIRUB SERPL-MCNC: 0.5 MG/DL
BILIRUBIN URINE: NEGATIVE
BLOOD URINE: NEGATIVE
BUN SERPL-MCNC: 14 MG/DL
CALCIUM SERPL-MCNC: 9.4 MG/DL
CHLORIDE SERPL-SCNC: 103 MMOL/L
CHOLEST SERPL-MCNC: 247 MG/DL
CO2 SERPL-SCNC: 24 MMOL/L
COLOR: NORMAL
CREAT SERPL-MCNC: 1.15 MG/DL
EOSINOPHIL # BLD AUTO: 0.32 K/UL
EOSINOPHIL NFR BLD AUTO: 5 %
ESTIMATED AVERAGE GLUCOSE: 111 MG/DL
GLUCOSE QUALITATIVE U: NEGATIVE
GLUCOSE SERPL-MCNC: 87 MG/DL
HBA1C MFR BLD HPLC: 5.5 %
HCT VFR BLD CALC: 47.7 %
HDLC SERPL-MCNC: 42 MG/DL
HGB BLD-MCNC: 15.7 G/DL
HYALINE CASTS: 0 /LPF
IMM GRANULOCYTES NFR BLD AUTO: 0.2 %
KETONES URINE: NEGATIVE
LDLC SERPL CALC-MCNC: 136 MG/DL
LEUKOCYTE ESTERASE URINE: NEGATIVE
LYMPHOCYTES # BLD AUTO: 2.65 K/UL
LYMPHOCYTES NFR BLD AUTO: 41.3 %
MAN DIFF?: NORMAL
MCHC RBC-ENTMCNC: 27.4 PG
MCHC RBC-ENTMCNC: 32.9 GM/DL
MCV RBC AUTO: 83.2 FL
MICROSCOPIC-UA: NORMAL
MONOCYTES # BLD AUTO: 0.5 K/UL
MONOCYTES NFR BLD AUTO: 7.8 %
NEUTROPHILS # BLD AUTO: 2.85 K/UL
NEUTROPHILS NFR BLD AUTO: 44.5 %
NITRITE URINE: NEGATIVE
NONHDLC SERPL-MCNC: 205 MG/DL
PH URINE: 5.5
PLATELET # BLD AUTO: 249 K/UL
POTASSIUM SERPL-SCNC: 4 MMOL/L
PROT SERPL-MCNC: 7.2 G/DL
PROTEIN URINE: NEGATIVE
PSA SERPL-MCNC: 0.58 NG/ML
RBC # BLD: 5.73 M/UL
RBC # FLD: 13.3 %
RED BLOOD CELLS URINE: 0 /HPF
SARS-COV-2 IGG SERPL IA-ACNC: 17.2 INDEX
SARS-COV-2 IGG SERPL QL IA: POSITIVE
SODIUM SERPL-SCNC: 139 MMOL/L
SPECIFIC GRAVITY URINE: 1.02
SQUAMOUS EPITHELIAL CELLS: 0 /HPF
TRIGL SERPL-MCNC: 347 MG/DL
UROBILINOGEN URINE: NORMAL
WBC # FLD AUTO: 6.41 K/UL
WHITE BLOOD CELLS URINE: 0 /HPF

## 2022-02-04 ENCOUNTER — APPOINTMENT (OUTPATIENT)
Dept: INTERNAL MEDICINE | Facility: CLINIC | Age: 52
End: 2022-02-04
Payer: MEDICAID

## 2022-02-04 VITALS — DIASTOLIC BLOOD PRESSURE: 80 MMHG | SYSTOLIC BLOOD PRESSURE: 126 MMHG

## 2022-02-04 VITALS
WEIGHT: 185 LBS | BODY MASS INDEX: 28.04 KG/M2 | DIASTOLIC BLOOD PRESSURE: 74 MMHG | SYSTOLIC BLOOD PRESSURE: 112 MMHG | TEMPERATURE: 97.7 F | HEIGHT: 68 IN | HEART RATE: 86 BPM | OXYGEN SATURATION: 98 %

## 2022-02-04 DIAGNOSIS — Z11.59 ENCOUNTER FOR SCREENING FOR OTHER VIRAL DISEASES: ICD-10-CM

## 2022-02-04 PROCEDURE — 99396 PREV VISIT EST AGE 40-64: CPT | Mod: 25

## 2022-02-04 PROCEDURE — 90750 HZV VACC RECOMBINANT IM: CPT

## 2022-02-04 PROCEDURE — 90471 IMMUNIZATION ADMIN: CPT

## 2022-02-04 NOTE — ASSESSMENT
[FreeTextEntry1] : Patient is a 51-year-old male with history of kidney stones, BPH, erectile dysfunction, isolated elevated blood pressure who presents for comprehensive annual physical examination complaint of erectile dysfunction urinary frequency\par \par 1 urinary frequency\par most likely BPH\par check point-of-care urine and glucose\par  on options will observe\par \par 2 decreased libido erectile dysfunction\par check testosterone will observe for now\par \par 3. Isolated elevated blood pressure\par normotensive no LVH on EKG\par \par 4 history of kidney stones\par coverage hydration no recurrence\par \par 5 health maintenance\par referral for colonoscopy\par shingles vaccine today\par check routine labs\par follow-up in 6 to 12 months.

## 2022-02-04 NOTE — HISTORY OF PRESENT ILLNESS
[FreeTextEntry1] : Comprehensive annual physical examination [de-identified] : Patient is a healthy 51-year-old male with history of kidney stones, history of elevated blood pressure, BPH, and constipation who presents for comprehensive annual physical examination. Patient feels well complains of decreased libido and mild erectile dysfunction denies chest pain, shortness of breath palpitation lightheadedness patient also notes some also increased frequency of urination specially at night denies dysuria

## 2022-02-04 NOTE — REVIEW OF SYSTEMS
[Constipation] : constipation [Hesitancy] : hesitancy [Nocturia] : nocturia [Frequency] : frequency [Impotence] : impotence [Poor Libido] : poor libido [Negative] : Respiratory [Abdominal Pain] : no abdominal pain [Nausea] : no nausea [Diarrhea] : no diarrhea [Vomiting] : no vomiting [Heartburn] : no heartburn [Melena] : no melena [Dysuria] : no dysuria [Incontinence] : no incontinence [Hematuria] : no hematuria

## 2022-02-04 NOTE — PHYSICAL EXAM
[Normal Sclera/Conjunctiva] : normal sclera/conjunctiva [Normal Appearance] : normal in appearance [No Masses] : no palpable masses [No Nipple Discharge] : no nipple discharge [No Axillary Lymphadenopathy] : no axillary lymphadenopathy [Normal Sphincter Tone] : normal sphincter tone [No Mass] : no mass [Penis Abnormality] : normal circumcised penis [Scrotum] : the scrotum was normal [Testes Tenderness] : no tenderness of the testes [Testes Mass (___cm)] : there were no testicular masses [Prostate Enlarged] : was enlarged [Normal] : affect was normal and insight and judgment were intact [Stool Occult Blood] : stool negative for occult blood [Prostate Nodule] : did not have a nodule [Prostate Tenderness] : was not tender [Prostate Fluctuant] : was not fluctuant

## 2022-02-04 NOTE — HEALTH RISK ASSESSMENT
[Patient unable to screen] : Patient unable to screen [Yes] : Yes [2 - 3 times a week (3 pts)] : 2 - 3  times a week (3 points) [1 or 2 (0 pts)] : 1 or 2 (0 points) [Never (0 pts)] : Never (0 points) [No falls in past year] : Patient reported no falls in the past year [0] : 2) Feeling down, depressed, or hopeless: Not at all (0) [PHQ-2 Negative - No further assessment needed] : PHQ-2 Negative - No further assessment needed [Patient reported colonoscopy was abnormal] : Patient reported colonoscopy was abnormal [HIV test declined] : HIV test declined [Hepatitis C test declined] : Hepatitis C test declined [None] : None [With Family] : lives with family [# of Members in Household ___] :  household currently consist of [unfilled] member(s) [Employed] : employed [College] : College [] :  [# Of Children ___] : has [unfilled] children [Sexually Active] : sexually active [Feels Safe at Home] : Feels safe at home [Fully functional (bathing, dressing, toileting, transferring, walking, feeding)] : Fully functional (bathing, dressing, toileting, transferring, walking, feeding) [Fully functional (using the telephone, shopping, preparing meals, housekeeping, doing laundry, using] : Fully functional and needs no help or supervision to perform IADLs (using the telephone, shopping, preparing meals, housekeeping, doing laundry, using transportation, managing medications and managing finances) [Reports normal functional visual acuity (ie: able to read med bottle)] : Reports normal functional visual acuity [Smoke Detector] : smoke detector [Carbon Monoxide Detector] : carbon monoxide detector [Safety elements used in home] : safety elements used in home [Seat Belt] :  uses seat belt [Sunscreen] : uses sunscreen [Audit-CScore] : 3 [de-identified] : None [de-identified] : Lately eating poorly [[id number]] : 0 [Change in mental status noted] : No change in mental status noted [Language] : denies difficulty with language [Behavior] : denies difficulty with behavior [Handling Complex Tasks] : denies difficulty handling complex tasks [Reasoning] : denies difficulty with reasoning [Spatial Ability and Orientation] : denies difficulty with spatial ability and orientation [Reports changes in hearing] : Reports no changes in hearing [Reports changes in vision] : Reports no changes in vision [Reports changes in dental health] : Reports no changes in dental health [Guns at Home] : no guns at home [Travel to Developing Areas] : does not  travel to developing areas [TB Exposure] : is not being exposed to tuberculosis [Caregiver Concerns] : does not have caregiver concerns

## 2022-02-08 ENCOUNTER — TRANSCRIPTION ENCOUNTER (OUTPATIENT)
Age: 52
End: 2022-02-08

## 2022-02-11 LAB
ALBUMIN SERPL ELPH-MCNC: 4.8 G/DL
ALP BLD-CCNC: 69 U/L
ALT SERPL-CCNC: 29 U/L
ANION GAP SERPL CALC-SCNC: 14 MMOL/L
AST SERPL-CCNC: 23 U/L
BASOPHILS # BLD AUTO: 0.07 K/UL
BASOPHILS NFR BLD AUTO: 1.1 %
BILIRUB SERPL-MCNC: 0.6 MG/DL
BUN SERPL-MCNC: 14 MG/DL
CALCIUM SERPL-MCNC: 9.4 MG/DL
CHLORIDE SERPL-SCNC: 101 MMOL/L
CHOLEST SERPL-MCNC: 274 MG/DL
CO2 SERPL-SCNC: 24 MMOL/L
CREAT SERPL-MCNC: 0.96 MG/DL
EOSINOPHIL # BLD AUTO: 0.18 K/UL
EOSINOPHIL NFR BLD AUTO: 3 %
ESTIMATED AVERAGE GLUCOSE: 111 MG/DL
GLUCOSE SERPL-MCNC: 93 MG/DL
HBA1C MFR BLD HPLC: 5.5 %
HCT VFR BLD CALC: 49 %
HDLC SERPL-MCNC: 40 MG/DL
HGB BLD-MCNC: 16.4 G/DL
IMM GRANULOCYTES NFR BLD AUTO: 0.2 %
LDLC SERPL CALC-MCNC: NORMAL MG/DL
LYMPHOCYTES # BLD AUTO: 2.26 K/UL
LYMPHOCYTES NFR BLD AUTO: 37.1 %
MAN DIFF?: NORMAL
MCHC RBC-ENTMCNC: 28.5 PG
MCHC RBC-ENTMCNC: 33.5 GM/DL
MCV RBC AUTO: 85.2 FL
MONOCYTES # BLD AUTO: 0.48 K/UL
MONOCYTES NFR BLD AUTO: 7.9 %
NEUTROPHILS # BLD AUTO: 3.09 K/UL
NEUTROPHILS NFR BLD AUTO: 50.7 %
NONHDLC SERPL-MCNC: 234 MG/DL
PLATELET # BLD AUTO: 262 K/UL
POTASSIUM SERPL-SCNC: 3.9 MMOL/L
PROT SERPL-MCNC: 7.5 G/DL
PSA SERPL-MCNC: 0.48 NG/ML
RBC # BLD: 5.75 M/UL
RBC # FLD: 13 %
SODIUM SERPL-SCNC: 139 MMOL/L
TESTOST SERPL-MCNC: 265 NG/DL
TRIGL SERPL-MCNC: 432 MG/DL
WBC # FLD AUTO: 6.09 K/UL

## 2022-04-08 ENCOUNTER — APPOINTMENT (OUTPATIENT)
Dept: INTERNAL MEDICINE | Facility: CLINIC | Age: 52
End: 2022-04-08
Payer: MEDICAID

## 2022-04-08 VITALS
OXYGEN SATURATION: 98 % | BODY MASS INDEX: 26.98 KG/M2 | HEIGHT: 68 IN | SYSTOLIC BLOOD PRESSURE: 111 MMHG | HEART RATE: 88 BPM | WEIGHT: 178 LBS | DIASTOLIC BLOOD PRESSURE: 75 MMHG | TEMPERATURE: 97 F

## 2022-04-08 VITALS — DIASTOLIC BLOOD PRESSURE: 80 MMHG | SYSTOLIC BLOOD PRESSURE: 120 MMHG

## 2022-04-08 VITALS — SYSTOLIC BLOOD PRESSURE: 114 MMHG | DIASTOLIC BLOOD PRESSURE: 74 MMHG

## 2022-04-08 DIAGNOSIS — R06.02 SHORTNESS OF BREATH: ICD-10-CM

## 2022-04-08 DIAGNOSIS — M54.9 DORSALGIA, UNSPECIFIED: ICD-10-CM

## 2022-04-08 DIAGNOSIS — N20.0 CALCULUS OF KIDNEY: ICD-10-CM

## 2022-04-08 LAB
BILIRUB UR QL STRIP: NEGATIVE
CLARITY UR: CLEAR
COLLECTION METHOD: NORMAL
GLUCOSE UR-MCNC: NEGATIVE
HCG UR QL: 0.2 EU/DL
HGB UR QL STRIP.AUTO: NORMAL
KETONES UR-MCNC: NORMAL
LEUKOCYTE ESTERASE UR QL STRIP: NEGATIVE
NITRITE UR QL STRIP: NEGATIVE
PH UR STRIP: 6.5
PROT UR STRIP-MCNC: 30
SP GR UR STRIP: 1.02

## 2022-04-08 PROCEDURE — 81003 URINALYSIS AUTO W/O SCOPE: CPT | Mod: QW

## 2022-04-08 PROCEDURE — 99214 OFFICE O/P EST MOD 30 MIN: CPT | Mod: 25

## 2022-04-08 RX ORDER — AMLODIPINE BESYLATE 5 MG/1
5 TABLET ORAL DAILY
Qty: 30 | Refills: 3 | Status: DISCONTINUED | COMMUNITY
Start: 2020-05-11 | End: 2022-04-08

## 2022-04-08 NOTE — ASSESSMENT
[FreeTextEntry1] : Patient is a 51-year-old male with history of hyperlipidemia, borderline, history of transaminases, BPH, isolated elevated blood pressure, kidney stones who presents for complaint of dyspnea exertion/lightheadedness and back pain and questionable family history of hypertrophic cardiomyopathy\par \par 1. Exertional dyspnea\par EKG no LVH\par will schedule stress echocardiogram rule out coronary disease and cardiomyopathy\par try to reassure patient\par \par 2. Borderline hyperlipidemia\par patient wants to be treated will start statin therapy after resolution of back pain\par \par 3 low back pain\par urine showed trace urine doubt kidney stones\par trial of nonsteroidal's and stretching warm soaks\par \par 4. History of kidney stones\par only RBCs bilateral data kidney stones will observe if persist consider sonogram

## 2022-04-08 NOTE — REVIEW OF SYSTEMS
[Shortness Of Breath] : no shortness of breath [Wheezing] : no wheezing [Cough] : no cough [Dyspnea on Exertion] : dyspnea on exertion [Negative] : Heme/Lymph

## 2022-04-08 NOTE — HISTORY OF PRESENT ILLNESS
[FreeTextEntry8] : \par \par CC: several month history of exertional shortness of breath and one month history of  back pain\par \par HPI the patient is a 13 three-year-old male with history of kidney stones, BPH, isolated elevated blood pressure, kidney stones who presents with several month onset history of increasing exertional shortness of breath associated with occasional dizziness described as lightheaded as passing out denies chest pain palpitations nausea vomiting. Also for the last month been having 63/10 lower back pain nonradiating.. Patient reports family history of mother having a cardiomyopathy and grandmother. Also notes told he had an enlarged heart in the past.

## 2022-04-08 NOTE — PHYSICAL EXAM
[Normal Sclera/Conjunctiva] : normal sclera/conjunctiva [Normal Outer Ear/Nose] : the outer ears and nose were normal in appearance [Normal] : no joint swelling and grossly normal strength and tone [de-identified] : Strength five out of five sensory intact negative straight leg raise.

## 2022-04-11 PROBLEM — Z11.59 SCREENING FOR VIRAL DISEASE: Status: RESOLVED | Noted: 2020-12-18 | Resolved: 2022-02-04

## 2022-04-13 RX ORDER — MELOXICAM 15 MG/1
15 TABLET ORAL
Qty: 30 | Refills: 1 | Status: ACTIVE | COMMUNITY
Start: 2022-04-13 | End: 1900-01-01

## 2022-04-16 ENCOUNTER — RESULT REVIEW (OUTPATIENT)
Age: 52
End: 2022-04-16

## 2022-04-16 ENCOUNTER — APPOINTMENT (OUTPATIENT)
Dept: ULTRASOUND IMAGING | Facility: HOSPITAL | Age: 52
End: 2022-04-16

## 2022-04-16 ENCOUNTER — OUTPATIENT (OUTPATIENT)
Dept: OUTPATIENT SERVICES | Facility: HOSPITAL | Age: 52
LOS: 1 days | End: 2022-04-16
Payer: MEDICAID

## 2022-04-16 PROCEDURE — 76700 US EXAM ABDOM COMPLETE: CPT | Mod: 26

## 2022-04-16 PROCEDURE — 76857 US EXAM PELVIC LIMITED: CPT | Mod: 26

## 2022-04-16 PROCEDURE — 76700 US EXAM ABDOM COMPLETE: CPT

## 2022-04-16 PROCEDURE — 76857 US EXAM PELVIC LIMITED: CPT

## 2022-04-27 ENCOUNTER — FORM ENCOUNTER (OUTPATIENT)
Age: 52
End: 2022-04-27

## 2022-04-28 ENCOUNTER — OUTPATIENT (OUTPATIENT)
Dept: OUTPATIENT SERVICES | Facility: HOSPITAL | Age: 52
LOS: 1 days | End: 2022-04-28
Payer: MEDICAID

## 2022-04-28 DIAGNOSIS — R06.02 SHORTNESS OF BREATH: ICD-10-CM

## 2022-04-28 PROCEDURE — 93351 STRESS TTE COMPLETE: CPT | Mod: 26,52

## 2022-04-28 PROCEDURE — 93351 STRESS TTE COMPLETE: CPT

## 2022-06-30 ENCOUNTER — APPOINTMENT (OUTPATIENT)
Dept: INTERNAL MEDICINE | Facility: CLINIC | Age: 52
End: 2022-06-30

## 2022-06-30 VITALS
HEART RATE: 68 BPM | HEIGHT: 68 IN | TEMPERATURE: 98 F | SYSTOLIC BLOOD PRESSURE: 142 MMHG | BODY MASS INDEX: 27.89 KG/M2 | OXYGEN SATURATION: 98 % | DIASTOLIC BLOOD PRESSURE: 87 MMHG | WEIGHT: 184 LBS

## 2022-06-30 PROCEDURE — 99213 OFFICE O/P EST LOW 20 MIN: CPT

## 2022-06-30 RX ORDER — DICLOFENAC SODIUM 1% 10 MG/G
1 GEL TOPICAL DAILY
Qty: 1 | Refills: 2 | Status: ACTIVE | COMMUNITY
Start: 2022-06-30 | End: 1900-01-01

## 2022-07-01 LAB
ALBUMIN SERPL ELPH-MCNC: 4.7 G/DL
ALP BLD-CCNC: 66 U/L
ALT SERPL-CCNC: 26 U/L
AST SERPL-CCNC: 26 U/L
BILIRUB DIRECT SERPL-MCNC: 0.1 MG/DL
BILIRUB INDIRECT SERPL-MCNC: 0.4 MG/DL
BILIRUB SERPL-MCNC: 0.5 MG/DL
PROT SERPL-MCNC: 7.2 G/DL

## 2022-07-01 NOTE — PHYSICAL EXAM
[Normal] : no acute distress, well nourished, well developed and well-appearing [Soft] : abdomen soft [Non Tender] : non-tender [No Masses] : no abdominal mass palpated [No HSM] : no HSM [No CVA Tenderness] : no CVA  tenderness [No Spinal Tenderness] : no spinal tenderness [de-identified] : no ROQ tenderness [de-identified] : 4-5 cm localized area of palpable tenderness righe lower thoracic paraspinal muscles, without spasm, worse with twisting and bending to the right; full ROM in flexion and extension

## 2022-07-01 NOTE — HISTORY OF PRESENT ILLNESS
[FreeTextEntry8] : 52 yo M, here for right sided back pain. Pt reports having had this in the past, now more severe, as bad as an 8/10, took 3 alleve, an advil and one of his wife's muscle relaxers. Now it is more a soreness, 5/10, does not radiate, no n/v, no Gu symptoms, no blood in urine. Pt states this feels very much like his GB, had large stone removed, and this was all back pain, not abdominal pain, and in same area. He works as a , right-handed does not do much stretching or strengthening

## 2022-07-26 ENCOUNTER — APPOINTMENT (OUTPATIENT)
Dept: ORTHOPEDIC SURGERY | Facility: CLINIC | Age: 52
End: 2022-07-26

## 2022-07-26 DIAGNOSIS — M51.36 OTHER INTERVERTEBRAL DISC DEGENERATION, LUMBAR REGION: ICD-10-CM

## 2022-07-26 PROCEDURE — 99204 OFFICE O/P NEW MOD 45 MIN: CPT | Mod: 1L

## 2022-07-26 PROCEDURE — XXXXX: CPT | Mod: 1L

## 2022-10-31 PROBLEM — M51.36 DISC DEGENERATION, LUMBAR: Status: ACTIVE | Noted: 2022-10-31

## 2022-12-23 ENCOUNTER — APPOINTMENT (OUTPATIENT)
Dept: INTERNAL MEDICINE | Facility: CLINIC | Age: 52
End: 2022-12-23

## 2022-12-23 VITALS
HEART RATE: 88 BPM | SYSTOLIC BLOOD PRESSURE: 131 MMHG | OXYGEN SATURATION: 99 % | TEMPERATURE: 97.1 F | BODY MASS INDEX: 28.95 KG/M2 | HEIGHT: 68 IN | WEIGHT: 191 LBS | DIASTOLIC BLOOD PRESSURE: 89 MMHG

## 2022-12-23 VITALS — DIASTOLIC BLOOD PRESSURE: 88 MMHG | SYSTOLIC BLOOD PRESSURE: 130 MMHG

## 2022-12-23 DIAGNOSIS — R61 GENERALIZED HYPERHIDROSIS: ICD-10-CM

## 2022-12-23 DIAGNOSIS — Z23 ENCOUNTER FOR IMMUNIZATION: ICD-10-CM

## 2022-12-23 PROCEDURE — 99214 OFFICE O/P EST MOD 30 MIN: CPT | Mod: 25

## 2022-12-23 RX ORDER — TADALAFIL 10 MG/1
10 TABLET, FILM COATED ORAL
Qty: 6 | Refills: 6 | Status: ACTIVE | COMMUNITY
Start: 2022-12-23 | End: 1900-01-01

## 2022-12-23 NOTE — ASSESSMENT
[FreeTextEntry1] : Patient is a 52-year-old male with history of overweight, isolate elevated blood pressure, kidney stones, borderline hypercholesterolemia transaminases who presents with several week history of increased urinary frequency and night sweats and with elevated blood pressure\par \par 1 urinary frequency\par rectal exams of mild prostate enlargement\par check UA with micro and culture check glucose and electrolytes\par PSA done recently normal\par start Flomax 0.4\par follow-up in four weeks\par \par 2 night sweats\par rule out urinary tract infection with UA\par check CBC said rate LFTs\par \par 3 isolated elevated blood pressure\par borderline elevation counseled on diet exercise\par follow-up in one month\par \par 4. Overweight\par counseled on diet and exercise\par \par 5 health maintenance\par flu shot and shingles vaccine today\par \par 6 decreased libido\par recent check testosterone normal but yet asked for Viagra or Cialis\par will observe. Maybe issues in marriage.\par

## 2022-12-23 NOTE — HISTORY OF PRESENT ILLNESS
[FreeTextEntry8] : \par \par CC: urinary frequency and night sweats for the past few weeks \par HPI patient is a 52-year-old male with history of borderline elevated blood pressure, kidney stones, rectal dysfunction, borderline hypercholesterolemia history of abnormal liver function test who presents for complaint of several week history of urinary frequency goes all the time can't hold it denies burning, discharge, fever chills flank pain. Also notes night sweats feeling hot sweaty at night no fever taken. No cough no shortness of breath no diarrhea no rash.

## 2022-12-23 NOTE — REVIEW OF SYSTEMS
[Night Sweats] : night sweats [Incontinence] : incontinence [Hesitancy] : hesitancy [Nocturia] : nocturia [Frequency] : frequency [Impotence] : impotence [Poor Libido] : poor libido [Negative] : Psychiatric [Fever] : no fever [Chills] : no chills [Fatigue] : no fatigue [Hot Flashes] : no hot flashes [Recent Change In Weight] : ~T no recent weight change [Dysuria] : no dysuria [Hematuria] : no hematuria

## 2022-12-23 NOTE — PHYSICAL EXAM
[Normal Sclera/Conjunctiva] : normal sclera/conjunctiva [Normal Outer Ear/Nose] : the outer ears and nose were normal in appearance [Scrotum] : the scrotum was normal [Testes Tenderness] : no tenderness of the testes [Prostate Enlarged] : was enlarged [Normal] : affect was normal and insight and judgment were intact [Prostate Nodule] : did not have a nodule [Prostate Tenderness] : was not tender [Prostate Fluctuant] : was not fluctuant

## 2022-12-27 LAB
ALBUMIN SERPL ELPH-MCNC: 4.3 G/DL
ALP BLD-CCNC: 53 U/L
ALT SERPL-CCNC: 39 U/L
ANION GAP SERPL CALC-SCNC: 10 MMOL/L
APPEARANCE: CLEAR
AST SERPL-CCNC: 29 U/L
BASOPHILS # BLD AUTO: 0.08 K/UL
BASOPHILS NFR BLD AUTO: 1.5 %
BILIRUB SERPL-MCNC: 0.4 MG/DL
BILIRUBIN URINE: NEGATIVE
BLOOD URINE: NEGATIVE
BUN SERPL-MCNC: 13 MG/DL
CALCIUM SERPL-MCNC: 9.4 MG/DL
CHLORIDE SERPL-SCNC: 105 MMOL/L
CO2 SERPL-SCNC: 24 MMOL/L
COLOR: YELLOW
CREAT SERPL-MCNC: 1.15 MG/DL
CRP SERPL-MCNC: <3 MG/L
EGFR: 76 ML/MIN/1.73M2
EOSINOPHIL # BLD AUTO: 0.19 K/UL
EOSINOPHIL NFR BLD AUTO: 3.5 %
ERYTHROCYTE [SEDIMENTATION RATE] IN BLOOD BY WESTERGREN METHOD: 13 MM/HR
GLUCOSE QUALITATIVE U: NEGATIVE
GLUCOSE SERPL-MCNC: 93 MG/DL
HCT VFR BLD CALC: 45.3 %
HGB BLD-MCNC: 15.1 G/DL
IMM GRANULOCYTES NFR BLD AUTO: 0.2 %
KETONES URINE: NEGATIVE
LEUKOCYTE ESTERASE URINE: NEGATIVE
LYMPHOCYTES # BLD AUTO: 2.22 K/UL
LYMPHOCYTES NFR BLD AUTO: 40.9 %
MAN DIFF?: NORMAL
MCHC RBC-ENTMCNC: 28.1 PG
MCHC RBC-ENTMCNC: 33.3 GM/DL
MCV RBC AUTO: 84.2 FL
MONOCYTES # BLD AUTO: 0.4 K/UL
MONOCYTES NFR BLD AUTO: 7.4 %
NEUTROPHILS # BLD AUTO: 2.53 K/UL
NEUTROPHILS NFR BLD AUTO: 46.5 %
NITRITE URINE: NEGATIVE
PH URINE: 6.5
PLATELET # BLD AUTO: 267 K/UL
POTASSIUM SERPL-SCNC: 4.2 MMOL/L
PROT SERPL-MCNC: 7 G/DL
PROTEIN URINE: NEGATIVE
RBC # BLD: 5.38 M/UL
RBC # FLD: 14.6 %
SODIUM SERPL-SCNC: 140 MMOL/L
SPECIFIC GRAVITY URINE: 1.02
TSH SERPL-ACNC: 1.67 UIU/ML
UROBILINOGEN URINE: NORMAL
WBC # FLD AUTO: 5.43 K/UL

## 2023-02-17 ENCOUNTER — APPOINTMENT (OUTPATIENT)
Dept: INTERNAL MEDICINE | Facility: CLINIC | Age: 53
End: 2023-02-17
Payer: MEDICAID

## 2023-02-17 VITALS
HEART RATE: 78 BPM | TEMPERATURE: 97.7 F | BODY MASS INDEX: 28.59 KG/M2 | SYSTOLIC BLOOD PRESSURE: 117 MMHG | OXYGEN SATURATION: 96 % | WEIGHT: 188 LBS | DIASTOLIC BLOOD PRESSURE: 74 MMHG

## 2023-02-17 VITALS — SYSTOLIC BLOOD PRESSURE: 120 MMHG | DIASTOLIC BLOOD PRESSURE: 82 MMHG

## 2023-02-17 DIAGNOSIS — N52.9 MALE ERECTILE DYSFUNCTION, UNSPECIFIED: ICD-10-CM

## 2023-02-17 DIAGNOSIS — R39.11 HESITANCY OF MICTURITION: ICD-10-CM

## 2023-02-17 PROCEDURE — 99214 OFFICE O/P EST MOD 30 MIN: CPT

## 2023-02-17 RX ORDER — TAMSULOSIN HYDROCHLORIDE 0.4 MG/1
0.4 CAPSULE ORAL
Qty: 30 | Refills: 3 | Status: ACTIVE | COMMUNITY
Start: 2022-12-23 | End: 1900-01-01

## 2023-02-17 NOTE — ASSESSMENT
[FreeTextEntry1] : Patient is a 52-year-old male with history of isolated elevated blood pressure, borderline hypercholesterolemia, history of abnormal liver function test and lower back pain who presents for follow-up of urinary frequency, isolate elevated blood pressure, hypercholesterolemia erectile dysfunction and complaints of right sided flank pain\par \par \par 1. Right-sided flank pain\par positional most likely musculoskeletal\par trial of Tylenol or Advil call back if fails to improve had recent sonogram negative for stones\par \par 2 urinary frequency/BPH\par most likely secondary BPH\par improved with Flomax continue Flomax 0.4\par \par 3 isolated elevated blood pressure\par normotensive today will observe\par \par 4 hypercholesterolemia\par counseled on diet check lipid profile next visit\par \par 5. Erectile dysfunction\par May use Cialis 10 mg as needed\par \par 6 health maintenance\par schedule CPE in May

## 2023-02-17 NOTE — HISTORY OF PRESENT ILLNESS
[FreeTextEntry1] : Follow-up for urinary frequency night sweats elevated blood pressure and hypercholesterolemia [de-identified] : Patient is a 52-year-old male with history of intermittent isolated elevated blood pressure, borderline hypercholesterolemia, history of transaminases who presented several weeks ago complaining of urinary frequency sweats workup was negative started on Flomax now presents for follow-up. Patient notes improvement in urinary frequency improvement nocturia denies fever, chills, dysuria. Patient also complains of right sided back pain. Pain mainly with twisting movement. Also denies headache, dizziness

## 2024-02-16 ENCOUNTER — APPOINTMENT (OUTPATIENT)
Dept: INTERNAL MEDICINE | Facility: CLINIC | Age: 54
End: 2024-02-16
Payer: MEDICAID

## 2024-02-16 VITALS
WEIGHT: 180 LBS | BODY MASS INDEX: 27.28 KG/M2 | HEIGHT: 68 IN | TEMPERATURE: 98 F | DIASTOLIC BLOOD PRESSURE: 83 MMHG | SYSTOLIC BLOOD PRESSURE: 126 MMHG | HEART RATE: 70 BPM | OXYGEN SATURATION: 96 %

## 2024-02-16 VITALS — SYSTOLIC BLOOD PRESSURE: 120 MMHG | DIASTOLIC BLOOD PRESSURE: 86 MMHG

## 2024-02-16 VITALS — SYSTOLIC BLOOD PRESSURE: 110 MMHG | DIASTOLIC BLOOD PRESSURE: 86 MMHG

## 2024-02-16 DIAGNOSIS — R03.0 ELEVATED BLOOD-PRESSURE READING, W/OUT DIAGNOSIS OF HYPERTENSION: ICD-10-CM

## 2024-02-16 DIAGNOSIS — M54.9 DORSALGIA, UNSPECIFIED: ICD-10-CM

## 2024-02-16 DIAGNOSIS — E78.5 HYPERLIPIDEMIA, UNSPECIFIED: ICD-10-CM

## 2024-02-16 DIAGNOSIS — R68.82 DECREASED LIBIDO: ICD-10-CM

## 2024-02-16 DIAGNOSIS — R35.0 FREQUENCY OF MICTURITION: ICD-10-CM

## 2024-02-16 DIAGNOSIS — G89.29 DORSALGIA, UNSPECIFIED: ICD-10-CM

## 2024-02-16 DIAGNOSIS — Z00.00 ENCOUNTER FOR GENERAL ADULT MEDICAL EXAMINATION W/OUT ABNORMAL FINDINGS: ICD-10-CM

## 2024-02-16 DIAGNOSIS — N40.0 BENIGN PROSTATIC HYPERPLASIA WITHOUT LOWER URINARY TRACT SYMPMS: ICD-10-CM

## 2024-02-16 PROCEDURE — 99396 PREV VISIT EST AGE 40-64: CPT | Mod: 25

## 2024-02-16 PROCEDURE — 36415 COLL VENOUS BLD VENIPUNCTURE: CPT

## 2024-02-16 PROCEDURE — G0008: CPT

## 2024-02-16 PROCEDURE — 90686 IIV4 VACC NO PRSV 0.5 ML IM: CPT

## 2024-02-21 NOTE — HISTORY OF PRESENT ILLNESS
[FreeTextEntry1] : Comprehensive annual physical examination follow-up for isolated elevated blood pressure borderline hyperlipidemia BPH [de-identified] : The patient is a 53-year-old male with history of borderline hypercholesterolemia, BPH with symptoms, isolated elevated blood pressure chronic lower back pain, intermittent constipation night sweats who presents for comprehensive annual physical examination.  Patient feels well complains of persistent night sweats and frequent urination denies chest pain, shortness of breath dyspnea exertion palpitations

## 2024-02-21 NOTE — PHYSICAL EXAM
[Normal Sclera/Conjunctiva] : normal sclera/conjunctiva [Normal Outer Ear/Nose] : the outer ears and nose were normal in appearance [Normal Appearance] : normal in appearance [No Masses] : no palpable masses [No Axillary Lymphadenopathy] : no axillary lymphadenopathy [No Stool to Guaiac] : no stool to guaiac [Normal Sphincter Tone] : normal sphincter tone [No Mass] : no mass [Scrotum] : the scrotum was normal [Penis Abnormality] : normal circumcised penis [Testes Tenderness] : no tenderness of the testes [Prostate Enlarged] : was enlarged [Normal] : affect was normal and insight and judgment were intact [Stool Occult Blood] : stool negative for occult blood [Prostate Nodule] : did not have a nodule [Prostate Tenderness] : was not tender [Prostate Fluctuant] : was not fluctuant

## 2024-02-21 NOTE — REVIEW OF SYSTEMS
[Night Sweats] : night sweats [Constipation] : constipation [Nocturia] : nocturia [Frequency] : frequency [Negative] : Heme/Lymph [Fever] : no fever [Chills] : no chills [Fatigue] : no fatigue [Hot Flashes] : no hot flashes [Recent Change In Weight] : ~T no recent weight change [Abdominal Pain] : no abdominal pain [Nausea] : no nausea [Diarrhea] : no diarrhea [Vomiting] : no vomiting [Heartburn] : no heartburn [Melena] : no melena [Dysuria] : no dysuria [Incontinence] : no incontinence [Hesitancy] : no hesitancy [Hematuria] : no hematuria [Impotence] : no impotency [Poor Libido] : libido not poor

## 2024-02-21 NOTE — HEALTH RISK ASSESSMENT
Occupational Therapy Daily Treatment    Visit Count: 4    Precautions: NA    SUBJECTIVE   Present and reporting subjective information:Winter     Current Pain/Behaviors: NA  Functional Change: Has done his exercise     OBJECTIVE       Type of Session:  Core-dination Group        OBSERVATION/OBJECTIVE:  Today we worked on:   Coordination   Balance   Proximal Strength     Assessment:  Social Participation: Good   Improved: body Awareness   Needs to work : proximal strength  and flexibility  Developmental Drive: Good    Goals areas addressed:         1. Goal #1: Pt. will be able to complete 3 novel bilateral coordination games with minimal physical assistance.       2. Goal #2: Pt. Will be able to sequence and coordinate upper and lower extremities reciprocally in functional play.        3. Goal #3: Family will be (I) in home program established in OT/PT group session.    PLAN:  1x week for 6 weeks OT/PT group therapy     OTHER RECOMMENDATIONS/HOME PROGRAM:  · Patient will do their areas of difficulty including 10 squats            .   [Good] : ~his/her~  mood as  good [Yes] : Yes [2 - 3 times a week (3 pts)] : 2 - 3  times a week (3 points) [1 or 2 (0 pts)] : 1 or 2 (0 points) [Never (0 pts)] : Never (0 points) [No falls in past year] : Patient reported no falls in the past year [0] : 2) Feeling down, depressed, or hopeless: Not at all (0) [PHQ-2 Negative - No further assessment needed] : PHQ-2 Negative - No further assessment needed [Patient reported colonoscopy was normal] : Patient reported colonoscopy was normal [HIV test declined] : HIV test declined [Hepatitis C test declined] : Hepatitis C test declined [None] : None [With Family] : lives with family [# of Members in Household ___] :  household currently consist of [unfilled] member(s) [Employed] : employed [College] : College [] :  [# Of Children ___] : has [unfilled] children [Sexually Active] : sexually active [Feels Safe at Home] : Feels safe at home [Fully functional (bathing, dressing, toileting, transferring, walking, feeding)] : Fully functional (bathing, dressing, toileting, transferring, walking, feeding) [Fully functional (using the telephone, shopping, preparing meals, housekeeping, doing laundry, using] : Fully functional and needs no help or supervision to perform IADLs (using the telephone, shopping, preparing meals, housekeeping, doing laundry, using transportation, managing medications and managing finances) [Reports normal functional visual acuity (ie: able to read med bottle)] : Reports normal functional visual acuity [Smoke Detector] : smoke detector [Carbon Monoxide Detector] : carbon monoxide detector [Safety elements used in home] : safety elements used in home [Seat Belt] :  uses seat belt [Sunscreen] : uses sunscreen [Never] : Never [Audit-CScore] : 3 [de-identified] : On and off exercise program [de-identified] : On and off diet but tends to be lower in fat [[id number]] : 0 [Change in mental status noted] : No change in mental status noted [Language] : denies difficulty with language [Behavior] : denies difficulty with behavior [Learning/Retaining New Information] : denies difficulty learning/retaining new information [Handling Complex Tasks] : denies difficulty handling complex tasks [Reasoning] : denies difficulty with reasoning [Spatial Ability and Orientation] : denies difficulty with spatial ability and orientation [High Risk Behavior] : no high risk behavior [Reports changes in hearing] : Reports no changes in hearing [Reports changes in vision] : Reports no changes in vision [Reports changes in dental health] : Reports no changes in dental health [Guns at Home] : no guns at home [Travel to Developing Areas] : does not  travel to developing areas [TB Exposure] : is not being exposed to tuberculosis [Caregiver Concerns] : does not have caregiver concerns [ColonoscopyDate] : 2018 September

## 2024-02-21 NOTE — ASSESSMENT
[FreeTextEntry1] : Patient is a 53-year-old male with history of borderline isolated elevated blood pressure,, borderline hyperlipidemia transaminitis BPH with urinary frequency chronic lower back pain, decreased libido who presents for comprehensive annual physical examination complaining of decreased libido urinary frequency night sweats  1.  Night sweats decreased libido Will check testosterone rule out low T  2.  Isolated elevated blood pressure Will observe counseled diet and exercise follow-up in 2 months  3.  BPH With urinary symptoms we will check PSA start Flomax 0.4 daily  4.  Borderline hypercholesterolemia Check lipid profile  5.  history of transaminitis  check LFTs.  6 lower back pain chronic Discussed exercises continue nonsteroidals as needed  #7 health maintenance Colonoscopy up-to-date Immunizations flu shot today, routine labs checked in office EKG no LVH.

## 2024-02-23 LAB
ALBUMIN SERPL ELPH-MCNC: 4.7 G/DL
ALP BLD-CCNC: 63 U/L
ALT SERPL-CCNC: 27 U/L
ANION GAP SERPL CALC-SCNC: 13 MMOL/L
AST SERPL-CCNC: 21 U/L
BILIRUB SERPL-MCNC: 0.5 MG/DL
BUN SERPL-MCNC: 16 MG/DL
CALCIUM SERPL-MCNC: 9.4 MG/DL
CHLORIDE SERPL-SCNC: 105 MMOL/L
CHOLEST SERPL-MCNC: 259 MG/DL
CO2 SERPL-SCNC: 22 MMOL/L
CREAT SERPL-MCNC: 1.08 MG/DL
EGFR: 81 ML/MIN/1.73M2
ESTIMATED AVERAGE GLUCOSE: 105 MG/DL
GLUCOSE SERPL-MCNC: 97 MG/DL
HBA1C MFR BLD HPLC: 5.3 %
HCT VFR BLD CALC: 46.7 %
HDLC SERPL-MCNC: 41 MG/DL
HGB BLD-MCNC: 15.9 G/DL
LDLC SERPL CALC-MCNC: 160 MG/DL
MCHC RBC-ENTMCNC: 27.4 PG
MCHC RBC-ENTMCNC: 34 GM/DL
MCV RBC AUTO: 80.5 FL
NONHDLC SERPL-MCNC: 218 MG/DL
PLATELET # BLD AUTO: 254 K/UL
POTASSIUM SERPL-SCNC: 4.1 MMOL/L
PROT SERPL-MCNC: 7.5 G/DL
PSA SERPL-MCNC: 0.52 NG/ML
RBC # BLD: 5.8 M/UL
RBC # FLD: 13.2 %
SODIUM SERPL-SCNC: 139 MMOL/L
TESTOST FREE SERPL-MCNC: 6.4 PG/ML
TESTOST SERPL-MCNC: 385 NG/DL
TRIGL SERPL-MCNC: 306 MG/DL
WBC # FLD AUTO: 5.49 K/UL

## 2024-11-01 ENCOUNTER — APPOINTMENT (OUTPATIENT)
Dept: INTERNAL MEDICINE | Facility: CLINIC | Age: 54
End: 2024-11-01
Payer: MEDICAID

## 2024-11-01 VITALS
HEART RATE: 79 BPM | DIASTOLIC BLOOD PRESSURE: 76 MMHG | SYSTOLIC BLOOD PRESSURE: 116 MMHG | BODY MASS INDEX: 27.43 KG/M2 | OXYGEN SATURATION: 98 % | WEIGHT: 181 LBS | HEIGHT: 68 IN | TEMPERATURE: 98.1 F

## 2024-11-01 VITALS — SYSTOLIC BLOOD PRESSURE: 120 MMHG | DIASTOLIC BLOOD PRESSURE: 80 MMHG

## 2024-11-01 DIAGNOSIS — H81.11 BENIGN PAROXYSMAL VERTIGO, RIGHT EAR: ICD-10-CM

## 2024-11-01 DIAGNOSIS — H81.10 BENIGN PAROXYSMAL VERTIGO, UNSPECIFIED EAR: ICD-10-CM

## 2024-11-01 PROCEDURE — 36415 COLL VENOUS BLD VENIPUNCTURE: CPT

## 2024-11-01 PROCEDURE — 99213 OFFICE O/P EST LOW 20 MIN: CPT

## 2024-11-01 RX ORDER — MECLIZINE HYDROCHLORIDE 25 MG/1
25 TABLET ORAL
Qty: 15 | Refills: 1 | Status: ACTIVE | COMMUNITY
Start: 2024-11-01 | End: 1900-01-01

## 2024-11-08 LAB
ANION GAP SERPL CALC-SCNC: 11 MMOL/L
BUN SERPL-MCNC: 18 MG/DL
CALCIUM SERPL-MCNC: 9 MG/DL
CHLORIDE SERPL-SCNC: 106 MMOL/L
CO2 SERPL-SCNC: 22 MMOL/L
CREAT SERPL-MCNC: 0.94 MG/DL
EGFR: 96 ML/MIN/1.73M2
GLUCOSE SERPL-MCNC: 93 MG/DL
HCT VFR BLD CALC: 45.4 %
HGB BLD-MCNC: 14.7 G/DL
POTASSIUM SERPL-SCNC: 3.8 MMOL/L
SODIUM SERPL-SCNC: 139 MMOL/L